# Patient Record
Sex: MALE | Race: WHITE | NOT HISPANIC OR LATINO
[De-identification: names, ages, dates, MRNs, and addresses within clinical notes are randomized per-mention and may not be internally consistent; named-entity substitution may affect disease eponyms.]

---

## 2018-07-02 ENCOUNTER — APPOINTMENT (OUTPATIENT)
Dept: DERMATOLOGY | Facility: CLINIC | Age: 18
End: 2018-07-02

## 2021-05-15 ENCOUNTER — EMERGENCY (EMERGENCY)
Facility: HOSPITAL | Age: 21
LOS: 1 days | Discharge: HOME | End: 2021-05-15
Attending: EMERGENCY MEDICINE
Payer: COMMERCIAL

## 2021-05-15 VITALS
HEART RATE: 75 BPM | OXYGEN SATURATION: 98 % | HEIGHT: 68 IN | DIASTOLIC BLOOD PRESSURE: 72 MMHG | RESPIRATION RATE: 18 BRPM | WEIGHT: 154.98 LBS | SYSTOLIC BLOOD PRESSURE: 112 MMHG | TEMPERATURE: 99 F

## 2021-05-15 VITALS
DIASTOLIC BLOOD PRESSURE: 69 MMHG | SYSTOLIC BLOOD PRESSURE: 119 MMHG | TEMPERATURE: 98 F | OXYGEN SATURATION: 100 % | RESPIRATION RATE: 19 BRPM | HEART RATE: 62 BPM

## 2021-05-15 PROCEDURE — 99284 EMERGENCY DEPT VISIT MOD MDM: CPT

## 2021-05-15 PROCEDURE — 99053 MED SERV 10PM-8AM 24 HR FAC: CPT

## 2021-05-15 PROCEDURE — 99283 EMERGENCY DEPT VISIT LOW MDM: CPT

## 2021-05-15 RX ORDER — ACETAMINOPHEN 500 MG
975 TABLET ORAL ONCE
Refills: 0 | Status: COMPLETED | OUTPATIENT
Start: 2021-05-15 | End: 2021-05-15

## 2021-05-15 RX ADMIN — Medication 975 MILLIGRAM(S): at 23:45

## 2021-05-15 NOTE — ED ADULT NURSE NOTE - OBJECTIVE STATEMENT
pt presents to the ED s/p game of rugby where he hit his head, pt denies LOC, blood thinner use, pt complaining of headache to frontal region, pt denies NVD, fever, numbness, tingling, pt AOx4, pt denies neck pain or back pain, pt able to move all four extremities, PEERLA, pt has no other complaints at this time

## 2021-05-15 NOTE — ED PROVIDER NOTE - PATIENT PORTAL LINK FT
You can access the FollowMyHealth Patient Portal offered by [facility] by registering at the following website: [email or web address]. By joining [facility]’s FollowMyHealth portal, you will also be able to view your health information using other applications (apps) compatible with our system.

## 2021-05-15 NOTE — ED PROVIDER NOTE - OBJECTIVE STATEMENT
20y M pt presents to the ED c/o HA s/p being hit in the head by an unidentified object while playing rugby today. Patient was going in for a tackle, does not remember what he hit his head on, but became dazed, saw white streaks in his vision, and stumbled a few steps. Examined by field medic who suspected a concussion, but pt decided to come into the ED for further evaluation himself. BARRAZA was initially rated a 3/10 on the severity scale associated with minimal sensitivity to light. Took a nap at 1800 and HA seemed to worsen to a 5/10 on the severity scale after waking up. Able to ambulate with no difficulties. Denies taking medications for sx relief. Further denies numbness/tingling, vomiting, weakness, or pain anywhere else in the body. NKDA. 20y M pt with no significant PMHx (from [facility]) presents to the ED c/o frontal, pressure-like HA S/P being hit in the head by an unidentified object while playing rugby at 1200 today. Patient was going in for a tackle, does not remember what he hit his head on, but became dazed, saw white streaks in his vision, and stumbled a few steps. Examined by field medic who suspected a concussion, but pt decided to come into the ED for further evaluation himself. BARRAZA was initially rated a 3/10 on the severity scale associated with minimal sensitivity to light. Took a nap at 1800 and HA seemed to worsen to a 5/10 on the severity scale after waking up. Able to ambulate with no difficulties. Denies taking medications for sx relief. Further denies numbness/tingling, vomiting, weakness, or pain anywhere else in the body. NKDA.

## 2021-05-15 NOTE — ED PROVIDER NOTE - NSFOLLOWUPCLINICS_GEN_ALL_ED_FT
[facility]  Neurology  [address] [place] [address] [address] [place] [address]  Phone: [phone]  Fax:

## 2021-05-15 NOTE — ED PROVIDER NOTE - CLINICAL SUMMARY MEDICAL DECISION MAKING FREE TEXT BOX
Aleksandr Bell): Likely concussion. No indication for a CTH at this time secondary to normal exam. Pt is from [facility], has concussion protocol with set limitations on activities. Will give Tylenol, education on return precautions, and D/C.

## 2021-05-15 NOTE — ED PROVIDER NOTE - NEUROLOGICAL, MLM
Alert and oriented, no focal deficits, no motor or sensory deficits. No dysmetria, 5/5 strength in B/L UE and LE.

## 2021-05-15 NOTE — ED PROVIDER NOTE - NSFOLLOWUPINSTRUCTIONS_ED_ALL_ED_FT
Take 975mg of Tylenol every 6hrs and 600mg Ibuprofen (Advil/Motrin) every 8 hrs as needed for pains/fevers.  Drink plenty of fluids.  Follow up with your doctor or neurologist as needed within 1 week.  Return to the ER for any concerns.

## 2021-05-15 NOTE — ED PROVIDER NOTE - MUSCULOSKELETAL, MLM
Spine appears normal, range of motion is not limited, no muscle or joint tenderness. No cervical, midline, or paraspinal TTP.
